# Patient Record
Sex: MALE | Race: BLACK OR AFRICAN AMERICAN | NOT HISPANIC OR LATINO | Employment: FULL TIME | URBAN - METROPOLITAN AREA
[De-identification: names, ages, dates, MRNs, and addresses within clinical notes are randomized per-mention and may not be internally consistent; named-entity substitution may affect disease eponyms.]

---

## 2023-05-18 ENCOUNTER — OCCUPATIONAL HEALTH (OUTPATIENT)
Dept: URGENT CARE | Facility: CLINIC | Age: 33
End: 2023-05-18

## 2023-05-18 DIAGNOSIS — Z02.83 ENCOUNTER FOR DRUG SCREENING: Primary | ICD-10-CM

## 2023-05-18 LAB
CTP QC/QA: YES
POC 5 PANEL DRUG SCREEN: NEGATIVE

## 2023-05-18 PROCEDURE — 80305 DRUG TEST PRSMV DIR OPT OBS: CPT | Mod: ,,, | Performed by: NURSE PRACTITIONER

## 2023-05-18 PROCEDURE — 80305 POCT RAPID DRUG SCREEN 5 PANEL: ICD-10-PCS | Mod: ,,, | Performed by: NURSE PRACTITIONER

## 2024-03-21 ENCOUNTER — HOSPITAL ENCOUNTER (EMERGENCY)
Facility: HOSPITAL | Age: 34
Discharge: HOME | End: 2024-03-21
Attending: EMERGENCY MEDICINE

## 2024-03-21 VITALS
DIASTOLIC BLOOD PRESSURE: 83 MMHG | WEIGHT: 240 LBS | RESPIRATION RATE: 14 BRPM | OXYGEN SATURATION: 97 % | HEART RATE: 79 BPM | HEIGHT: 66 IN | BODY MASS INDEX: 38.57 KG/M2 | TEMPERATURE: 98 F | SYSTOLIC BLOOD PRESSURE: 140 MMHG

## 2024-03-21 DIAGNOSIS — J06.9 VIRAL URI WITH COUGH: Primary | ICD-10-CM

## 2024-03-21 LAB
GROUP A STREP, MOLECULAR: NEGATIVE
INFLUENZA A, MOLECULAR: NEGATIVE
INFLUENZA B, MOLECULAR: NEGATIVE
SARS-COV-2 RDRP RESP QL NAA+PROBE: NEGATIVE
SPECIMEN SOURCE: NORMAL

## 2024-03-21 PROCEDURE — 99283 EMERGENCY DEPT VISIT LOW MDM: CPT | Mod: 25

## 2024-03-21 PROCEDURE — 87502 INFLUENZA DNA AMP PROBE: CPT

## 2024-03-21 PROCEDURE — 87651 STREP A DNA AMP PROBE: CPT

## 2024-03-21 PROCEDURE — U0002 COVID-19 LAB TEST NON-CDC: HCPCS

## 2024-03-21 RX ORDER — BENZONATATE 100 MG/1
100 CAPSULE ORAL 3 TIMES DAILY PRN
Qty: 15 CAPSULE | Refills: 0 | Status: SHIPPED | OUTPATIENT
Start: 2024-03-21 | End: 2024-03-26

## 2024-03-21 NOTE — Clinical Note
"Alfredo Bradford" Jun was seen and treated in our emergency department on 3/21/2024.  He may return to work on 03/28/2024.       If you have any questions or concerns, please don't hesitate to call.      Margaret Vazquez NP"

## 2024-03-21 NOTE — ED PROVIDER NOTES
Encounter Date: 3/21/2024       History     Chief Complaint   Patient presents with    Cough    Nasal Congestion     Patient is a 33 y.o. male with no significant past medical history who presents to ED via self for concern for cough and nasal congestion which began 4 day(s) ago.  Patient reports he was had a dry cough.  Patient denies sore throat or ear pain.  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  Patient denies shortness of breath today.  Patient denies any vomiting or diarrhea.  Patient denies chest pain.  Patient denies fever.  Patient is awake and alert in no acute distress.         Review of patient's allergies indicates:  Not on File  No past medical history on file.  No past surgical history on file.  No family history on file.     Review of Systems   Constitutional:  Negative for fever.   HENT:  Positive for congestion. Negative for drooling, ear discharge, ear pain, facial swelling, sore throat, trouble swallowing and voice change.    Respiratory:  Positive for cough and shortness of breath. Negative for apnea, choking, chest tightness, wheezing and stridor.    Cardiovascular:  Negative for chest pain.   Gastrointestinal: Negative.  Negative for abdominal pain, nausea and vomiting.   Genitourinary: Negative.  Negative for dysuria.   Musculoskeletal:  Negative for back pain, neck pain and neck stiffness.   Skin:  Negative for color change, pallor and rash.   Neurological: Negative.  Negative for weakness.   Hematological:  Does not bruise/bleed easily.   Psychiatric/Behavioral: Negative.         Physical Exam     Initial Vitals [03/21/24 0809]   BP Pulse Resp Temp SpO2   (!) 140/83 79 14 97.6 °F (36.4 °C) 97 %      MAP       --         Physical Exam    Nursing note and vitals reviewed.  Constitutional: He appears well-developed and well-nourished. He is not diaphoretic.  Non-toxic appearance. He does not have a sickly appearance.  He does not appear ill. No distress.   HENT:   Head: Normocephalic and atraumatic.   Right Ear: Tympanic membrane, external ear and ear canal normal.   Left Ear: Tympanic membrane, external ear and ear canal normal.   Nose: Nose normal.   Mouth/Throat: Uvula is midline and mucous membranes are normal. Posterior oropharyngeal erythema present. No oropharyngeal exudate, posterior oropharyngeal edema or tonsillar abscesses.   Eyes: Conjunctivae and EOM are normal. Pupils are equal, round, and reactive to light.   Neck: Trachea normal. Neck supple.   Normal range of motion.   Full passive range of motion without pain.     Cardiovascular:  Normal rate, regular rhythm, normal heart sounds and intact distal pulses.     Exam reveals no gallop and no friction rub.       No murmur heard.  Pulmonary/Chest: Breath sounds normal. No respiratory distress. He has no wheezes. He has no rhonchi. He has no rales. He exhibits no tenderness.   Abdominal: Abdomen is soft. He exhibits no distension and no mass. There is no abdominal tenderness. There is no rebound and no guarding.   Musculoskeletal:         General: Normal range of motion.      Cervical back: Full passive range of motion without pain, normal range of motion and neck supple. No edema, erythema or rigidity. No spinous process tenderness or muscular tenderness. Normal range of motion.     Neurological: He is alert and oriented to person, place, and time. He has normal strength. GCS score is 15. GCS eye subscore is 4. GCS verbal subscore is 5. GCS motor subscore is 6.   Skin: Skin is warm and dry. Capillary refill takes less than 2 seconds.   Psychiatric: He has a normal mood and affect. His behavior is normal. Judgment and thought content normal.         ED Course   Procedures  Labs Reviewed   GROUP A STREP, MOLECULAR   INFLUENZA A AND B ANTIGEN    Narrative:     Specimen Source->Nasopharyngeal Swab   SARS-COV-2 RNA AMPLIFICATION, QUAL          Imaging Results               X-Ray Chest PA And Lateral (Final result)  Result time 03/21/24 10:05:36      Final result by Marcos Cervantes MD (03/21/24 10:05:36)                   Narrative:    HISTORY: Cough    FINDINGS: Two view chest radiograph with no prior studies for comparison show the trachea is midline, with the cardiomediastinal silhouette and pulmonary vascular distribution within normal limits.    The lungs are normally and symmetrically expanded, with no consolidation, pleural effusion or evidence of pulmonary edema. No confluent infiltrates or pneumothorax. There are no significant osseous abnormalities.    IMPRESSION: No evidence of active cardiopulmonary disease.    Electronically signed by:  Marcos Cervantes MD  03/21/2024 10:05 AM CDT Workstation: [id number]                                     Medications - No data to display  Medical Decision Making  MDM    Patient presents for emergent evaluation of acute cough that poses a possible threat to life and/or bodily function.    Differential diagnosis included but not limited to upper viral respiratory illness COVID, influenza, strep, pneumonia.  In the ED patient found to have acute clear lung sounds bilaterally with no increased work of breathing.  Patient has mild posterior pharynx erythema with no significant swelling or pustular exudate.  Patient has a normal TMs bilaterally.  Patient is awake and alert interactive in no acute distress.  Patient denying any current shortness of breath or chest pain.    Discharge MDM  I discussed the patient presentation labs, X-rays, findings with my attending Dr. Valiente.    Patient was managed in the ED with evaluation and re-evaluation.    The response to treatment was good.  Patient negative for COVID flu and strep.  Patient was chest x-ray within normal limits.  It was likely diagnosis at this time upper viral respiratory illness.  Patient is in no acute distress nontoxic appearing.  Patient safe for discharge home.  I discussed patient's  symptomatic treatment and over-the-counter medications to take for his upper viral respiratory illness.  Patient was discharged in stable condition with close follow up with primary care.  Detailed return precautions discussed to return to the ED for chest pain, shortness of breath, fever, or any new or worsening concerns.  Patient states understanding    Amount and/or Complexity of Data Reviewed  Labs: ordered.  Radiology: ordered. Decision-making details documented in ED Course.    Risk  OTC drugs.               ED Course as of 03/21/24 1809   Thu Mar 21, 2024   1106 X-Ray Chest PA And Lateral  IMPRESSION: No evidence of active cardiopulmonary disease. [MP]      ED Course User Index  [MP] Margaret Vazquez NP                           Clinical Impression:  Final diagnoses:  [J06.9] Viral URI with cough (Primary)          ED Disposition Condition    Discharge Stable          ED Prescriptions       Medication Sig Dispense Start Date End Date Auth. Provider    benzonatate (TESSALON) 100 MG capsule Take 1 capsule (100 mg total) by mouth 3 (three) times daily as needed for Cough. 15 capsule 3/21/2024 3/26/2024 Margaret Vazquez NP          Follow-up Information       Follow up With Specialties Details Why Contact Info Additional Information    [facility]  Schedule an appointment as soon as possible for a visit  For primary care, For recheck/continuing care [address] [facility] [address]  [phone]       [facility] - Emergency Dept Emergency Medicine  [facility] symptoms worsen [address] [facility] [address]  [phone] 1st floor             Margaret Vazquez NP  03/21/24 6428

## 2024-03-21 NOTE — DISCHARGE INSTRUCTIONS
Please continue to take over-the-counter medications such as Tylenol and ibuprofen as well as nasal decongestants to help with your symptoms.  Continue to hydrate ranging plenty of water.  Follow up with [facility] for primary care and further evaluation.    Please return to the ED for chest pain, worsening shortness of breath, difficulty breathing, lightheaded dizziness, passing out, severe headaches, or any new or worsening concerns.